# Patient Record
Sex: MALE | Race: WHITE | Employment: FULL TIME | URBAN - METROPOLITAN AREA
[De-identification: names, ages, dates, MRNs, and addresses within clinical notes are randomized per-mention and may not be internally consistent; named-entity substitution may affect disease eponyms.]

---

## 2017-08-16 ENCOUNTER — APPOINTMENT (OUTPATIENT)
Dept: CT IMAGING | Age: 47
End: 2017-08-16
Attending: EMERGENCY MEDICINE
Payer: COMMERCIAL

## 2017-08-16 ENCOUNTER — HOSPITAL ENCOUNTER (EMERGENCY)
Age: 47
Discharge: HOME | End: 2017-08-16
Attending: EMERGENCY MEDICINE
Payer: COMMERCIAL

## 2017-08-16 VITALS
OXYGEN SATURATION: 98 % | RESPIRATION RATE: 14 BRPM | TEMPERATURE: 97.9 F | HEART RATE: 57 BPM | SYSTOLIC BLOOD PRESSURE: 117 MMHG | BODY MASS INDEX: 29.84 KG/M2 | HEIGHT: 75 IN | DIASTOLIC BLOOD PRESSURE: 81 MMHG | WEIGHT: 240 LBS

## 2017-08-16 DIAGNOSIS — R10.13 ABDOMINAL PAIN, EPIGASTRIC: Primary | ICD-10-CM

## 2017-08-16 LAB
ALBUMIN SERPL BCP-MCNC: 3.9 G/DL (ref 3.4–5)
ALBUMIN/GLOB SERPL: 1 {RATIO} (ref 0.8–1.7)
ALP SERPL-CCNC: 114 U/L (ref 45–117)
ALT SERPL-CCNC: 92 U/L (ref 16–61)
ANION GAP BLD CALC-SCNC: 8 MMOL/L (ref 3–18)
AST SERPL W P-5'-P-CCNC: 46 U/L (ref 15–37)
ATRIAL RATE: 55 BPM
BASOPHILS # BLD: 0 K/UL (ref 0–0.06)
BASOPHILS NFR BLD: 0 % (ref 0–2)
BILIRUB SERPL-MCNC: 0.4 MG/DL (ref 0.2–1)
BUN SERPL-MCNC: 18 MG/DL (ref 7–18)
BUN/CREAT SERPL: 16 (ref 12–20)
CALCIUM SERPL-MCNC: 8.7 MG/DL (ref 8.5–10.1)
CALCULATED P AXIS, ECG09: 0 DEGREES
CALCULATED R AXIS, ECG10: -7 DEGREES
CALCULATED T AXIS, ECG11: -2 DEGREES
CHLORIDE SERPL-SCNC: 104 MMOL/L (ref 100–108)
CK MB CFR SERPL CALC: 1 % (ref 0–4)
CK MB SERPL-MCNC: 2.8 NG/ML (ref 5–25)
CK SERPL-CCNC: 272 U/L (ref 39–308)
CO2 SERPL-SCNC: 31 MMOL/L (ref 21–32)
CREAT SERPL-MCNC: 1.13 MG/DL (ref 0.6–1.3)
DIAGNOSIS, 93000: NORMAL
DIFFERENTIAL METHOD BLD: ABNORMAL
EOSINOPHIL # BLD: 0.2 K/UL (ref 0–0.4)
EOSINOPHIL NFR BLD: 3 % (ref 0–5)
ERYTHROCYTE [DISTWIDTH] IN BLOOD BY AUTOMATED COUNT: 12.7 % (ref 11.6–14.5)
GLOBULIN SER CALC-MCNC: 3.8 G/DL (ref 2–4)
GLUCOSE SERPL-MCNC: 120 MG/DL (ref 74–99)
HCT VFR BLD AUTO: 48.9 % (ref 36–48)
HGB BLD-MCNC: 15.8 G/DL (ref 13–16)
LIPASE SERPL-CCNC: 293 U/L (ref 73–393)
LYMPHOCYTES # BLD: 3 K/UL (ref 0.9–3.6)
LYMPHOCYTES NFR BLD: 32 % (ref 21–52)
MCH RBC QN AUTO: 28.9 PG (ref 24–34)
MCHC RBC AUTO-ENTMCNC: 32.3 G/DL (ref 31–37)
MCV RBC AUTO: 89.4 FL (ref 74–97)
MONOCYTES # BLD: 1.1 K/UL (ref 0.05–1.2)
MONOCYTES NFR BLD: 12 % (ref 3–10)
NEUTS SEG # BLD: 5 K/UL (ref 1.8–8)
NEUTS SEG NFR BLD: 53 % (ref 40–73)
P-R INTERVAL, ECG05: 160 MS
PLATELET # BLD AUTO: 218 K/UL (ref 135–420)
PMV BLD AUTO: 11 FL (ref 9.2–11.8)
POTASSIUM SERPL-SCNC: 3.7 MMOL/L (ref 3.5–5.5)
PROT SERPL-MCNC: 7.7 G/DL (ref 6.4–8.2)
Q-T INTERVAL, ECG07: 422 MS
QRS DURATION, ECG06: 94 MS
QTC CALCULATION (BEZET), ECG08: 403 MS
RBC # BLD AUTO: 5.47 M/UL (ref 4.7–5.5)
SODIUM SERPL-SCNC: 143 MMOL/L (ref 136–145)
TROPONIN I SERPL-MCNC: <0.02 NG/ML (ref 0–0.06)
VENTRICULAR RATE, ECG03: 55 BPM
WBC # BLD AUTO: 9.2 K/UL (ref 4.6–13.2)

## 2017-08-16 PROCEDURE — 83690 ASSAY OF LIPASE: CPT | Performed by: EMERGENCY MEDICINE

## 2017-08-16 PROCEDURE — 74011000250 HC RX REV CODE- 250

## 2017-08-16 PROCEDURE — 96375 TX/PRO/DX INJ NEW DRUG ADDON: CPT

## 2017-08-16 PROCEDURE — 74011636320 HC RX REV CODE- 636/320: Performed by: EMERGENCY MEDICINE

## 2017-08-16 PROCEDURE — 74011250636 HC RX REV CODE- 250/636: Performed by: EMERGENCY MEDICINE

## 2017-08-16 PROCEDURE — 80053 COMPREHEN METABOLIC PANEL: CPT | Performed by: EMERGENCY MEDICINE

## 2017-08-16 PROCEDURE — 85025 COMPLETE CBC W/AUTO DIFF WBC: CPT | Performed by: EMERGENCY MEDICINE

## 2017-08-16 PROCEDURE — 74011000250 HC RX REV CODE- 250: Performed by: EMERGENCY MEDICINE

## 2017-08-16 PROCEDURE — 93005 ELECTROCARDIOGRAM TRACING: CPT

## 2017-08-16 PROCEDURE — 82550 ASSAY OF CK (CPK): CPT | Performed by: EMERGENCY MEDICINE

## 2017-08-16 PROCEDURE — 99283 EMERGENCY DEPT VISIT LOW MDM: CPT

## 2017-08-16 PROCEDURE — 96374 THER/PROPH/DIAG INJ IV PUSH: CPT

## 2017-08-16 PROCEDURE — 74011250637 HC RX REV CODE- 250/637: Performed by: EMERGENCY MEDICINE

## 2017-08-16 PROCEDURE — 74177 CT ABD & PELVIS W/CONTRAST: CPT

## 2017-08-16 PROCEDURE — 74011250636 HC RX REV CODE- 250/636

## 2017-08-16 RX ORDER — LIDOCAINE HYDROCHLORIDE 20 MG/ML
15 SOLUTION OROPHARYNGEAL ONCE
Status: COMPLETED | OUTPATIENT
Start: 2017-08-16 | End: 2017-08-16

## 2017-08-16 RX ORDER — HYDROMORPHONE HYDROCHLORIDE 1 MG/ML
1 INJECTION, SOLUTION INTRAMUSCULAR; INTRAVENOUS; SUBCUTANEOUS ONCE
Status: COMPLETED | OUTPATIENT
Start: 2017-08-16 | End: 2017-08-16

## 2017-08-16 RX ORDER — HYDROMORPHONE HYDROCHLORIDE 1 MG/ML
INJECTION, SOLUTION INTRAMUSCULAR; INTRAVENOUS; SUBCUTANEOUS
Status: DISCONTINUED
Start: 2017-08-16 | End: 2017-08-16 | Stop reason: HOSPADM

## 2017-08-16 RX ORDER — FAMOTIDINE 10 MG/ML
20 INJECTION INTRAVENOUS
Status: COMPLETED | OUTPATIENT
Start: 2017-08-16 | End: 2017-08-16

## 2017-08-16 RX ORDER — FAMOTIDINE 10 MG/ML
INJECTION INTRAVENOUS
Status: DISCONTINUED
Start: 2017-08-16 | End: 2017-08-16 | Stop reason: HOSPADM

## 2017-08-16 RX ORDER — FAMOTIDINE 20 MG/1
20 TABLET, FILM COATED ORAL 2 TIMES DAILY
Qty: 10 TAB | Refills: 0 | Status: SHIPPED | OUTPATIENT
Start: 2017-08-16 | End: 2017-08-21

## 2017-08-16 RX ORDER — ONDANSETRON 2 MG/ML
4 INJECTION INTRAMUSCULAR; INTRAVENOUS
Status: COMPLETED | OUTPATIENT
Start: 2017-08-16 | End: 2017-08-16

## 2017-08-16 RX ORDER — ONDANSETRON 2 MG/ML
INJECTION INTRAMUSCULAR; INTRAVENOUS
Status: DISCONTINUED
Start: 2017-08-16 | End: 2017-08-16 | Stop reason: HOSPADM

## 2017-08-16 RX ORDER — TRAMADOL HYDROCHLORIDE 50 MG/1
50 TABLET ORAL
Qty: 12 TAB | Refills: 0 | Status: SHIPPED | OUTPATIENT
Start: 2017-08-16

## 2017-08-16 RX ORDER — LIDOCAINE HYDROCHLORIDE 20 MG/ML
SOLUTION OROPHARYNGEAL
Status: COMPLETED
Start: 2017-08-16 | End: 2017-08-16

## 2017-08-16 RX ADMIN — PHENOBARBITAL 30 ML: 16.2; .1037; .0065; .0194 ELIXIR ORAL at 05:03

## 2017-08-16 RX ADMIN — LIDOCAINE HYDROCHLORIDE 15 ML: 20 SOLUTION OROPHARYNGEAL at 05:04

## 2017-08-16 RX ADMIN — LIDOCAINE HYDROCHLORIDE 15 ML: 20 SOLUTION ORAL; TOPICAL at 05:04

## 2017-08-16 RX ADMIN — ONDANSETRON 4 MG: 2 INJECTION INTRAMUSCULAR; INTRAVENOUS at 01:30

## 2017-08-16 RX ADMIN — IOPAMIDOL 100 ML: 612 INJECTION, SOLUTION INTRAVENOUS at 01:34

## 2017-08-16 RX ADMIN — FAMOTIDINE 20 MG: 10 INJECTION, SOLUTION INTRAVENOUS at 01:31

## 2017-08-16 RX ADMIN — HYDROMORPHONE HYDROCHLORIDE 1 MG: 1 INJECTION, SOLUTION INTRAMUSCULAR; INTRAVENOUS; SUBCUTANEOUS at 01:31

## 2017-08-16 NOTE — ED TRIAGE NOTES
Epigastric pain starting about 2 hours PTA. Denies n/v. Reports having a gallbladder attack several months ago and this is the same.

## 2017-08-16 NOTE — ED PROVIDER NOTES
HPI Comments: Valdo Snow is a 52 y.o. male presenting to the ED c/o epigastric pain and back pain that started about 2 hours pta. States pain begins in the epigastric region and radiates straight through to the back. Describes pain as 8/10 in severity and states \"I normally have a good tolerance of pain. \" Pt states he experienced similar symptoms 5 months ago and went to [place] [id number] and was told he had no gallstones. Notes taking 800mg Ibuprofen one and a half hours pta with no relief. Reports appendectomy and back surgery 7 months ago in which his L5 was fused. Denies any PMHx of acid reflux, heart problems, hypertension, and diabetes. Denies fever, chills, dysuria, hematuria, and any other symptoms or complaints. Patient is a 52 y.o. male presenting with epigastric pain. Epigastric Pain    Associated symptoms include back pain. Pertinent negatives include no fever, no nausea, no dysuria, no hematuria, no headaches, no arthralgias and no chest pain. History reviewed. No pertinent past medical history. History reviewed. No pertinent surgical history. History reviewed. No pertinent family history. Social History     Social History    Marital status:      Spouse name: N/A    Number of children: N/A    Years of education: N/A     Occupational History    Not on file. Social History Main Topics    Smoking status: Never Smoker    Smokeless tobacco: Never Used    Alcohol use Yes      Comment: occasionally    Drug use: No    Sexual activity: Not on file     Other Topics Concern    Not on file     Social History Narrative    No narrative on file         ALLERGIES: Review of patient's allergies indicates no known allergies. Review of Systems   Constitutional: Negative for chills and fever. HENT: Negative for sore throat. Eyes: Negative for redness and visual disturbance. Respiratory: Negative for shortness of breath and wheezing. Cardiovascular: Negative for chest pain. Gastrointestinal: Positive for abdominal pain (epigastric pain ). Negative for nausea. Endocrine: Negative for polyuria. Genitourinary: Negative for dysuria and hematuria. Musculoskeletal: Positive for back pain. Negative for arthralgias and neck stiffness. Skin: Negative for rash. Neurological: Negative for headaches. All other systems reviewed and are negative. Vitals:    08/16/17 0108 08/16/17 0300 08/16/17 0430   BP: 145/85 111/75 124/80   Pulse: (!) 54 (!) 57 (!) 57   Resp: 18 15 15   Temp: 97.9 °F (36.6 °C)     SpO2: 98% 96% 96%   Weight: 108.9 kg (240 lb)     Height: 6' 3\" (1.905 m)              Physical Exam   Constitutional: He is oriented to person, place, and time. He appears well-developed and well-nourished. No distress. HENT:   Head: Normocephalic and atraumatic. Mouth/Throat: Oropharynx is clear and moist.   Eyes: Conjunctivae are normal. Pupils are equal, round, and reactive to light. No scleral icterus. Neck: Normal range of motion. Neck supple. Cardiovascular: Intact distal pulses. Capillary refill < 3 seconds   Pulmonary/Chest: Effort normal and breath sounds normal. No respiratory distress. He has no wheezes. Abdominal: Soft. Bowel sounds are normal. He exhibits no distension. There is tenderness in the right lower quadrant and epigastric area. There is guarding. Musculoskeletal: Normal range of motion. He exhibits no edema. Lymphadenopathy:     He has no cervical adenopathy. Neurological: He is alert and oriented to person, place, and time. No cranial nerve deficit. Skin: Skin is warm and dry. He is not diaphoretic. Nursing note and vitals reviewed.        MDM  Number of Diagnoses or Management Options  Abdominal pain, epigastric:   Diagnosis management comments: ddx pancreatitis, biliary, constipation, AAA, kidney stone, gerd, neoplasm, metabolic    Labs, analgesia, pepcid, CT abd pelvis    Reassessed and pain improved    WBC wnl  Cr wnl    Awaiting for CT results    Pt states pain 5/10, will give GI cocktail    I have reassessed the patient. I have discussed the workup, results and plan with the patient and patient is in agreement. Patient is feeling better. Patient will be prescribed tramadol. Patient was discharge in stable condition. Patient was given outpatient follow up. Patient is to return to emergency department if any new or worsening condition.        Amount and/or Complexity of Data Reviewed  Clinical lab tests: ordered and reviewed  Tests in the radiology section of CPT®: ordered and reviewed  Tests in the medicine section of CPT®: ordered and reviewed  Review and summarize past medical records: yes  Independent visualization of images, tracings, or specimens: yes    Risk of Complications, Morbidity, and/or Mortality  Presenting problems: moderate  Diagnostic procedures: moderate  Management options: moderate    Patient Progress  Patient progress: improved    ED Course       Procedures      Vitals:  Patient Vitals for the past 12 hrs:   Temp Pulse Resp BP SpO2   08/16/17 0430 - (!) 57 15 124/80 96 %   08/16/17 0300 - (!) 57 15 111/75 96 %   08/16/17 0108 97.9 °F (36.6 °C) (!) 54 18 145/85 98 %       Medications Ordered:  Medications   HYDROmorphone (PF) (DILAUDID) injection 1 mg (1 mg IntraVENous Given 8/16/17 0131)   ondansetron (ZOFRAN) injection 4 mg (4 mg IntraVENous Given 8/16/17 0130)   famotidine (PF) (PEPCID) injection 20 mg (20 mg IntraVENous Given 8/16/17 0131)   iopamidol (ISOVUE 300) 61 % contrast injection 100 mL (100 mL IntraVENous Given 8/16/17 0134)   maalox/donnatal (GI COCKTAIL COMPOUND) oral liquid (30 mL Oral Given 8/16/17 0503)   lidocaine (XYLOCAINE) 2 % viscous solution 15 mL (15 mL Mouth/Throat Given 8/16/17 0504)         Lab Findings:  Recent Results (from the past 12 hour(s))   EKG, 12 LEAD, INITIAL    Collection Time: 08/16/17  1:08 AM   Result Value Ref Range    Ventricular Rate 55 BPM    Atrial Rate 55 BPM    P-R Interval 160 ms    QRS Duration 94 ms    Q-T Interval 422 ms    QTC Calculation (Bezet) 403 ms    Calculated P Axis 0 degrees    Calculated R Axis -7 degrees    Calculated T Axis -2 degrees    Diagnosis       Sinus bradycardia  Otherwise normal ECG  No previous ECGs available     CBC WITH AUTOMATED DIFF    Collection Time: 08/16/17  1:10 AM   Result Value Ref Range    WBC 9.2 4.6 - 13.2 K/uL    RBC 5.47 4.70 - 5.50 M/uL    HGB 15.8 13.0 - 16.0 g/dL    HCT 48.9 (H) 36.0 - 48.0 %    MCV 89.4 74.0 - 97.0 FL    MCH 28.9 24.0 - 34.0 PG    MCHC 32.3 31.0 - 37.0 g/dL    RDW 12.7 11.6 - 14.5 %    PLATELET 168 555 - 249 K/uL    MPV 11.0 9.2 - 11.8 FL    NEUTROPHILS 53 40 - 73 %    LYMPHOCYTES 32 21 - 52 %    MONOCYTES 12 (H) 3 - 10 %    EOSINOPHILS 3 0 - 5 %    BASOPHILS 0 0 - 2 %    ABS. NEUTROPHILS 5.0 1.8 - 8.0 K/UL    ABS. LYMPHOCYTES 3.0 0.9 - 3.6 K/UL    ABS. MONOCYTES 1.1 0.05 - 1.2 K/UL    ABS. EOSINOPHILS 0.2 0.0 - 0.4 K/UL    ABS. BASOPHILS 0.0 0.0 - 0.06 K/UL    DF AUTOMATED     METABOLIC PANEL, COMPREHENSIVE    Collection Time: 08/16/17  1:10 AM   Result Value Ref Range    Sodium 143 136 - 145 mmol/L    Potassium 3.7 3.5 - 5.5 mmol/L    Chloride 104 100 - 108 mmol/L    CO2 31 21 - 32 mmol/L    Anion gap 8 3.0 - 18 mmol/L    Glucose 120 (H) 74 - 99 mg/dL    BUN 18 7.0 - 18 MG/DL    Creatinine 1.13 0.6 - 1.3 MG/DL    BUN/Creatinine ratio 16 12 - 20      GFR est AA >60 >60 ml/min/1.73m2    GFR est non-AA >60 >60 ml/min/1.73m2    Calcium 8.7 8.5 - 10.1 MG/DL    Bilirubin, total 0.4 0.2 - 1.0 MG/DL    ALT (SGPT) 92 (H) 16 - 61 U/L    AST (SGOT) 46 (H) 15 - 37 U/L    Alk.  phosphatase 114 45 - 117 U/L    Protein, total 7.7 6.4 - 8.2 g/dL    Albumin 3.9 3.4 - 5.0 g/dL    Globulin 3.8 2.0 - 4.0 g/dL    A-G Ratio 1.0 0.8 - 1.7     LIPASE    Collection Time: 08/16/17  1:10 AM   Result Value Ref Range    Lipase 293 73 - 393 U/L   CARDIAC PANEL,(CK, CKMB & TROPONIN)    Collection Time: 08/16/17  1:10 AM   Result Value Ref Range     39 - 308 U/L    CK - MB 2.8 <3.6 ng/ml    CK-MB Index 1.0 0.0 - 4.0 %    Troponin-I, Qt. <0.02 0.00 - 0.06 NG/ML             X-ray, CT or radiology findings or impressions:  Ct Abd Pelv W Cont    Result Date: 8/16/2017  EXAMINATION: CT abdomen/pelvis with IV contrast INDICATION: Epigastric, right upper quadrant pain COMPARISON: None TECHNIQUE: CT of the abdomen and pelvis performed following IV contrast, with multiplanar reformations. All CT scans at this facility are performed using dose optimization technique as appropriate to a performed exam, to include automated exposure control, adjustment of the mA and/or kV according to patient size (including appropriate matching first site specific examinations), or use of iterative reconstruction technique. FINDINGS: Lower thorax: Bibasilar posterior groundglass opacities, likely atelectatic changes. Right middle lobe and lingular probable atelectasis versus scar. Liver: Low attenuation relative to the spleen. No suspicious lesions. Spleen: Normal. Pancreas: Normal. Biliary tree: Gallbladder unremarkable. No biliary duct dilatation. Adrenal glands: Right and left adrenal glands normal. Kidneys: Right kidney with probable subcentimeter cysts, without suspicious lesions or hydronephrosis. Left kidney with probable 1.4 cm cyst in the mid kidney, with probable additional subcentimeter cysts, without suspicious parenchymal lesions or hydronephrosis. Bladder: Unremarkable. Reproductive: Prostate unremarkable. Gastrointestinal: Stomach is unremarkable. Small bowel loops are nondilated. The colon is nondilated. Appendix not clearly visualized. Vessels: Major vessels are normal in course and caliber. Mesentery/nodes: No free air or free fluid. No retroperitoneal or pelvic adenopathy by size criteria. Miscellaneous: Superficial soft tissues unremarkable.  Bones: Status post L5-S1 posterior fixation and interbody spacer with grade 1 anterolisthesis. No acute osseous findings. IMPRESSION: 1. Hepatic steatosis. 2. Probable renal cysts. Appendix not visualized. L5-S1 lumbar fixation. Pulmonary basilar groundglass opacities, likely atelectasis rather than infiltrate. Diagnosis:   1. Abdominal pain, epigastric        Disposition: discharged    Follow-up Information     Follow up With Details Comments Contact Info    Follow up with your PCP in [place]. Call today For PCP follow up      Follow up with gastroenterologist in [place]. Call in 2 days For follow up      [id number] [facility] EMERGENCY DEPT  As needed, If symptoms worsen [address]           Patient's Medications   Start Taking    FAMOTIDINE (PEPCID) 20 MG TABLET    Take 1 Tab by mouth two (2) times a day for 5 days. Indications: DYSPEPSIA    TRAMADOL (ULTRAM) 50 MG TABLET    Take 1 Tab by mouth every six (6) hours as needed for Pain. Max Daily Amount: 200 mg. Continue Taking    No medications on file   These Medications have changed    No medications on file   Stop Taking    No medications on file       Scribe Attestation      Kyle Negron, acting as a scribe for and in the presence of Bubba Montaño DO      August 16, 2017 at 5:19 AM       Provider Attestation:      I personally performed the services described in the documentation, reviewed the documentation, as recorded by the scribe in my presence, and it accurately and completely records my words and actions.  August 16, 2017 at 5:19 AM - Bubba Montaño DO

## 2017-08-16 NOTE — DISCHARGE INSTRUCTIONS

## 2017-08-16 NOTE — ED NOTES
Pt reporting pain at 3/10 at time of d/c. I have reviewed discharge instructions with the patient and spouse. The patient and spouse verbalized understanding. Ambulatory from ED. Patient armband removed and shredded.